# Patient Record
Sex: FEMALE
[De-identification: names, ages, dates, MRNs, and addresses within clinical notes are randomized per-mention and may not be internally consistent; named-entity substitution may affect disease eponyms.]

---

## 2022-12-08 ENCOUNTER — P2P PATIENT RECORD (OUTPATIENT)
Age: 72
End: 2022-12-08

## 2022-12-29 ENCOUNTER — LAB OUTSIDE AN ENCOUNTER (OUTPATIENT)
Dept: URBAN - METROPOLITAN AREA CLINIC 84 | Facility: CLINIC | Age: 72
End: 2022-12-29

## 2022-12-29 ENCOUNTER — OFFICE VISIT (OUTPATIENT)
Dept: URBAN - METROPOLITAN AREA CLINIC 84 | Facility: CLINIC | Age: 72
End: 2022-12-29
Payer: MEDICARE

## 2022-12-29 VITALS
TEMPERATURE: 97.6 F | SYSTOLIC BLOOD PRESSURE: 148 MMHG | HEART RATE: 84 BPM | BODY MASS INDEX: 30.02 KG/M2 | WEIGHT: 186.8 LBS | HEIGHT: 66 IN | DIASTOLIC BLOOD PRESSURE: 83 MMHG

## 2022-12-29 DIAGNOSIS — Z86.010 PERSONAL HISTORY OF COLONIC POLYPS: ICD-10-CM

## 2022-12-29 DIAGNOSIS — R09.89 GLOBUS SENSATION: ICD-10-CM

## 2022-12-29 DIAGNOSIS — R14.2 BURPING: ICD-10-CM

## 2022-12-29 DIAGNOSIS — R13.10 ESOPHAGEAL DYSPHAGIA: ICD-10-CM

## 2022-12-29 PROCEDURE — 99204 OFFICE O/P NEW MOD 45 MIN: CPT | Performed by: INTERNAL MEDICINE

## 2022-12-29 RX ORDER — SUCRALFATE 1 G/1
TAKE 1 TABLET BY MOUTH 4 TIMES DAILY TABLET ORAL
Qty: 360 EACH | Refills: 0 | Status: ACTIVE | COMMUNITY

## 2022-12-29 RX ORDER — CEFADROXIL 500 MG/1
TAKE 1 CAPSULE BY MOUTH TWICE DAILY FOR 10 DAYS CAPSULE ORAL
Qty: 20 EACH | Refills: 0 | Status: ON HOLD | COMMUNITY

## 2022-12-29 RX ORDER — PANTOPRAZOLE 40 MG/1
TABLET, DELAYED RELEASE ORAL
Qty: 30 TABLET | Status: ACTIVE | COMMUNITY

## 2022-12-29 RX ORDER — EPINEPHRINE 0.3 MG/.3ML
AS DIRECTED INJECTION INTRAMUSCULAR; SUBCUTANEOUS
Qty: 2 EACH | Refills: 0 | Status: ON HOLD | COMMUNITY

## 2022-12-29 RX ORDER — ESCITALOPRAM 5 MG/1
TABLET, FILM COATED ORAL
Qty: 30 TABLET | Status: ON HOLD | COMMUNITY

## 2022-12-29 RX ORDER — DOXYCYCLINE HYCLATE 100 MG/1
TAKE 1 TABLET BY MOUTH TWICE DAILY FOR 7 DAYS TABLET, FILM COATED ORAL
Qty: 14 EACH | Refills: 0 | Status: ON HOLD | COMMUNITY

## 2022-12-29 RX ORDER — AMLODIPINE BESYLATE 5 MG/1
TABLET ORAL
Qty: 90 TABLET | Status: ACTIVE | COMMUNITY

## 2022-12-29 RX ORDER — LOSARTAN POTASSIUM AND HYDROCHLOROTHIAZIDE 100; 25 MG/1; MG/1
TABLET, FILM COATED ORAL
Qty: 90 TABLET | Status: ACTIVE | COMMUNITY

## 2022-12-29 RX ORDER — IPRATROPIUM BROMIDE 21 UG/1
USE 2 APPLICATIONS IN EACH NOSTRIL FOR RUNNY NOSE NASALLY 4 TIMES A DAY AS NEEDED FOR 30 DAYS SPRAY, METERED NASAL
Qty: 30 MILLILITER | Refills: 0 | Status: ON HOLD | COMMUNITY

## 2022-12-29 RX ORDER — EZETIMIBE 10 MG/1
TABLET ORAL
Qty: 90 TABLET | Status: ACTIVE | COMMUNITY

## 2022-12-29 RX ORDER — RABEPRAZOLE SODIUM 20 MG/1
TAKE 1 TABLET BY MOUTH ONCE DAILY TABLET, DELAYED RELEASE ORAL
Qty: 30 EACH | Refills: 1 | Status: ACTIVE | COMMUNITY

## 2022-12-29 RX ORDER — POLYETHYLENE GLYCOL 3350, SODIUM SULFATE ANHYDROUS, SODIUM BICARBONATE, SODIUM CHLORIDE, POTASSIUM CHLORIDE 236; 22.74; 6.74; 5.86; 2.97 G/4L; G/4L; G/4L; G/4L; G/4L
AS DIRECTED POWDER, FOR SOLUTION ORAL ONCE
Qty: 1 | Refills: 0 | OUTPATIENT
Start: 2022-12-29 | End: 2022-12-30

## 2022-12-29 RX ORDER — MONTELUKAST SODIUM 10 MG/1
TABLET, FILM COATED ORAL
Qty: 30 TABLET | Status: ON HOLD | COMMUNITY

## 2022-12-29 NOTE — HPI-TODAY'S VISIT:
December 2022 visit: Patient was seen in December 22 for acid reflux and chest discomfort - was evaluate at [facility] ER.  Chest x-ray was unrevealing.  Labs revealed normal LFTs, hemoglobin 12.  egd / colonoscopy in july 2018 done at [facility]. benign polyps were removed. possible gastritis , may be antibiotics prescribed.  has trouble burping. no dysphagia. no heartburn. also feels globus sensation. was prescribed protonix but she isnt taking. using carafate 3 times per day. using rabeprazole instead as prescribed by pcp.

## 2023-01-06 PROBLEM — 40890009 ESOPHAGEAL DYSPHAGIA: Status: ACTIVE | Noted: 2022-12-29

## 2023-01-12 ENCOUNTER — OFFICE VISIT (OUTPATIENT)
Dept: URBAN - METROPOLITAN AREA SURGERY CENTER 20 | Facility: SURGERY CENTER | Age: 73
End: 2023-01-12

## 2023-01-12 ENCOUNTER — CLAIMS CREATED FROM THE CLAIM WINDOW (OUTPATIENT)
Dept: URBAN - METROPOLITAN AREA CLINIC 4 | Facility: CLINIC | Age: 73
End: 2023-01-12
Payer: MEDICARE

## 2023-01-12 ENCOUNTER — CLAIMS CREATED FROM THE CLAIM WINDOW (OUTPATIENT)
Dept: URBAN - METROPOLITAN AREA SURGERY CENTER 20 | Facility: SURGERY CENTER | Age: 73
End: 2023-01-12
Payer: MEDICARE

## 2023-01-12 DIAGNOSIS — K29.60 ADENOPAPILLOMATOSIS GASTRICA: ICD-10-CM

## 2023-01-12 DIAGNOSIS — K31.7 BENIGN GASTRIC POLYP: ICD-10-CM

## 2023-01-12 DIAGNOSIS — K31.A0 GASTRIC INTESTINAL METAPLASIA, UNSPECIFIED: ICD-10-CM

## 2023-01-12 DIAGNOSIS — K31.89 OTHER DISEASES OF STOMACH AND DUODENUM: ICD-10-CM

## 2023-01-12 DIAGNOSIS — D13.1 BENIGN NEOPLASM OF STOMACH: ICD-10-CM

## 2023-01-12 PROCEDURE — 43239 EGD BIOPSY SINGLE/MULTIPLE: CPT | Performed by: INTERNAL MEDICINE

## 2023-01-12 PROCEDURE — 43251 EGD REMOVE LESION SNARE: CPT | Performed by: INTERNAL MEDICINE

## 2023-01-12 PROCEDURE — G8907 PT DOC NO EVENTS ON DISCHARG: HCPCS | Performed by: INTERNAL MEDICINE

## 2023-01-12 PROCEDURE — 88341 IMHCHEM/IMCYTCHM EA ADD ANTB: CPT | Performed by: PATHOLOGY

## 2023-01-12 PROCEDURE — 88305 TISSUE EXAM BY PATHOLOGIST: CPT | Performed by: PATHOLOGY

## 2023-01-12 PROCEDURE — 88342 IMHCHEM/IMCYTCHM 1ST ANTB: CPT | Performed by: PATHOLOGY

## 2023-01-12 RX ORDER — SUCRALFATE 1 G/1
TAKE 1 TABLET BY MOUTH 4 TIMES DAILY TABLET ORAL
Qty: 360 EACH | Refills: 0 | Status: ACTIVE | COMMUNITY

## 2023-01-12 RX ORDER — MONTELUKAST SODIUM 10 MG/1
TABLET, FILM COATED ORAL
Qty: 30 TABLET | Status: ON HOLD | COMMUNITY

## 2023-01-12 RX ORDER — EZETIMIBE 10 MG/1
TABLET ORAL
Qty: 90 TABLET | Status: ACTIVE | COMMUNITY

## 2023-01-12 RX ORDER — AMLODIPINE BESYLATE 5 MG/1
TABLET ORAL
Qty: 90 TABLET | Status: ACTIVE | COMMUNITY

## 2023-01-12 RX ORDER — DOXYCYCLINE HYCLATE 100 MG/1
TAKE 1 TABLET BY MOUTH TWICE DAILY FOR 7 DAYS TABLET, FILM COATED ORAL
Qty: 14 EACH | Refills: 0 | Status: ON HOLD | COMMUNITY

## 2023-01-12 RX ORDER — CEFADROXIL 500 MG/1
TAKE 1 CAPSULE BY MOUTH TWICE DAILY FOR 10 DAYS CAPSULE ORAL
Qty: 20 EACH | Refills: 0 | Status: ON HOLD | COMMUNITY

## 2023-01-12 RX ORDER — IPRATROPIUM BROMIDE 21 UG/1
USE 2 APPLICATIONS IN EACH NOSTRIL FOR RUNNY NOSE NASALLY 4 TIMES A DAY AS NEEDED FOR 30 DAYS SPRAY, METERED NASAL
Qty: 30 MILLILITER | Refills: 0 | Status: ON HOLD | COMMUNITY

## 2023-01-12 RX ORDER — EPINEPHRINE 0.3 MG/.3ML
AS DIRECTED INJECTION INTRAMUSCULAR; SUBCUTANEOUS
Qty: 2 EACH | Refills: 0 | Status: ON HOLD | COMMUNITY

## 2023-01-12 RX ORDER — ESCITALOPRAM 5 MG/1
TABLET, FILM COATED ORAL
Qty: 30 TABLET | Status: ON HOLD | COMMUNITY

## 2023-01-12 RX ORDER — PANTOPRAZOLE 40 MG/1
TABLET, DELAYED RELEASE ORAL
Qty: 30 TABLET | Status: ACTIVE | COMMUNITY

## 2023-01-12 RX ORDER — LOSARTAN POTASSIUM AND HYDROCHLOROTHIAZIDE 100; 25 MG/1; MG/1
TABLET, FILM COATED ORAL
Qty: 90 TABLET | Status: ACTIVE | COMMUNITY

## 2023-01-12 RX ORDER — RABEPRAZOLE SODIUM 20 MG/1
TAKE 1 TABLET BY MOUTH ONCE DAILY TABLET, DELAYED RELEASE ORAL
Qty: 30 EACH | Refills: 1 | Status: ACTIVE | COMMUNITY

## 2023-01-17 PROBLEM — 428283002 HISTORY OF POLYP OF COLON (SITUATION): Status: ACTIVE | Noted: 2022-12-29

## 2023-01-23 ENCOUNTER — TELEPHONE ENCOUNTER (OUTPATIENT)
Dept: URBAN - METROPOLITAN AREA CLINIC 84 | Facility: CLINIC | Age: 73
End: 2023-01-23

## 2023-01-25 ENCOUNTER — TELEPHONE ENCOUNTER (OUTPATIENT)
Dept: URBAN - METROPOLITAN AREA CLINIC 84 | Facility: CLINIC | Age: 73
End: 2023-01-25

## 2023-01-30 ENCOUNTER — TELEPHONE ENCOUNTER (OUTPATIENT)
Dept: URBAN - METROPOLITAN AREA CLINIC 84 | Facility: CLINIC | Age: 73
End: 2023-01-30

## 2023-02-03 ENCOUNTER — TELEPHONE ENCOUNTER (OUTPATIENT)
Dept: URBAN - METROPOLITAN AREA CLINIC 25 | Facility: CLINIC | Age: 73
End: 2023-02-03

## 2023-02-23 ENCOUNTER — WEB ENCOUNTER (OUTPATIENT)
Dept: URBAN - METROPOLITAN AREA SURGERY CENTER 20 | Facility: SURGERY CENTER | Age: 73
End: 2023-02-23

## 2023-02-23 ENCOUNTER — CLAIMS CREATED FROM THE CLAIM WINDOW (OUTPATIENT)
Dept: URBAN - METROPOLITAN AREA CLINIC 4 | Facility: CLINIC | Age: 73
End: 2023-02-23
Payer: MEDICARE

## 2023-02-23 ENCOUNTER — OFFICE VISIT (OUTPATIENT)
Dept: URBAN - METROPOLITAN AREA SURGERY CENTER 20 | Facility: SURGERY CENTER | Age: 73
End: 2023-02-23
Payer: MEDICARE

## 2023-02-23 DIAGNOSIS — D12.2 ADENOMA OF ASCENDING COLON: ICD-10-CM

## 2023-02-23 DIAGNOSIS — Z86.010 ADENOMAS PERSONAL HISTORY OF COLONIC POLYPS: ICD-10-CM

## 2023-02-23 DIAGNOSIS — D12.4 ADENOMA OF DESCENDING COLON: ICD-10-CM

## 2023-02-23 DIAGNOSIS — D12.3 BENIGN NEOPLASM OF TRANSVERSE COLON: ICD-10-CM

## 2023-02-23 DIAGNOSIS — D12.3 ADENOMA OF TRANSVERSE COLON: ICD-10-CM

## 2023-02-23 PROCEDURE — 88305 TISSUE EXAM BY PATHOLOGIST: CPT | Performed by: PATHOLOGY

## 2023-02-23 PROCEDURE — G8907 PT DOC NO EVENTS ON DISCHARG: HCPCS | Performed by: INTERNAL MEDICINE

## 2023-02-23 PROCEDURE — 45385 COLONOSCOPY W/LESION REMOVAL: CPT | Performed by: INTERNAL MEDICINE

## 2023-02-23 RX ORDER — DOXYCYCLINE HYCLATE 100 MG/1
TAKE 1 TABLET BY MOUTH TWICE DAILY FOR 7 DAYS TABLET, FILM COATED ORAL
Qty: 14 EACH | Refills: 0 | Status: ON HOLD | COMMUNITY

## 2023-02-23 RX ORDER — EPINEPHRINE 0.3 MG/.3ML
AS DIRECTED INJECTION INTRAMUSCULAR; SUBCUTANEOUS
Qty: 2 EACH | Refills: 0 | Status: ON HOLD | COMMUNITY

## 2023-02-23 RX ORDER — ESCITALOPRAM 5 MG/1
TABLET, FILM COATED ORAL
Qty: 30 TABLET | Status: ON HOLD | COMMUNITY

## 2023-02-23 RX ORDER — MONTELUKAST SODIUM 10 MG/1
TABLET, FILM COATED ORAL
Qty: 30 TABLET | Status: ON HOLD | COMMUNITY

## 2023-02-23 RX ORDER — EZETIMIBE 10 MG/1
TABLET ORAL
Qty: 90 TABLET | Status: ACTIVE | COMMUNITY

## 2023-02-23 RX ORDER — PANTOPRAZOLE 40 MG/1
TABLET, DELAYED RELEASE ORAL
Qty: 30 TABLET | Status: ACTIVE | COMMUNITY

## 2023-02-23 RX ORDER — CEFADROXIL 500 MG/1
TAKE 1 CAPSULE BY MOUTH TWICE DAILY FOR 10 DAYS CAPSULE ORAL
Qty: 20 EACH | Refills: 0 | Status: ON HOLD | COMMUNITY

## 2023-02-23 RX ORDER — IPRATROPIUM BROMIDE 21 UG/1
USE 2 APPLICATIONS IN EACH NOSTRIL FOR RUNNY NOSE NASALLY 4 TIMES A DAY AS NEEDED FOR 30 DAYS SPRAY, METERED NASAL
Qty: 30 MILLILITER | Refills: 0 | Status: ON HOLD | COMMUNITY

## 2023-02-23 RX ORDER — AMLODIPINE BESYLATE 5 MG/1
TABLET ORAL
Qty: 90 TABLET | Status: ACTIVE | COMMUNITY

## 2023-02-23 RX ORDER — RABEPRAZOLE SODIUM 20 MG/1
TAKE 1 TABLET BY MOUTH ONCE DAILY TABLET, DELAYED RELEASE ORAL
Qty: 30 EACH | Refills: 1 | Status: ACTIVE | COMMUNITY

## 2023-02-23 RX ORDER — SUCRALFATE 1 G/1
TAKE 1 TABLET BY MOUTH 4 TIMES DAILY TABLET ORAL
Qty: 360 EACH | Refills: 0 | Status: ACTIVE | COMMUNITY

## 2023-02-23 RX ORDER — LOSARTAN POTASSIUM AND HYDROCHLOROTHIAZIDE 100; 25 MG/1; MG/1
TABLET, FILM COATED ORAL
Qty: 90 TABLET | Status: ACTIVE | COMMUNITY

## 2023-03-06 LAB
H PYLORI BREATH TEST: DETECTED
H. PYLORI BREATH TEST: DETECTED
INTERPRETATION: DETECTED

## 2023-03-07 ENCOUNTER — TELEPHONE ENCOUNTER (OUTPATIENT)
Dept: URBAN - METROPOLITAN AREA CLINIC 25 | Facility: CLINIC | Age: 73
End: 2023-03-07

## 2023-03-07 RX ORDER — SUCRALFATE 1 G/1
TAKE 1 TABLET BY MOUTH 4 TIMES DAILY TABLET ORAL
Qty: 360 EACH | Refills: 0 | Status: ACTIVE | COMMUNITY

## 2023-03-07 RX ORDER — CEFADROXIL 500 MG/1
TAKE 1 CAPSULE BY MOUTH TWICE DAILY FOR 10 DAYS CAPSULE ORAL
Qty: 20 EACH | Refills: 0 | Status: ON HOLD | COMMUNITY

## 2023-03-07 RX ORDER — ESCITALOPRAM 5 MG/1
TABLET, FILM COATED ORAL
Qty: 30 TABLET | Status: ON HOLD | COMMUNITY

## 2023-03-07 RX ORDER — RABEPRAZOLE SODIUM 20 MG/1
TAKE 1 TABLET BY MOUTH ONCE DAILY TABLET, DELAYED RELEASE ORAL
Qty: 30 EACH | Refills: 1 | Status: ACTIVE | COMMUNITY

## 2023-03-07 RX ORDER — DOXYCYCLINE HYCLATE 100 MG/1
TAKE 1 TABLET BY MOUTH TWICE DAILY FOR 7 DAYS TABLET, FILM COATED ORAL
Qty: 14 EACH | Refills: 0 | Status: ON HOLD | COMMUNITY

## 2023-03-07 RX ORDER — EZETIMIBE 10 MG/1
TABLET ORAL
Qty: 90 TABLET | Status: ACTIVE | COMMUNITY

## 2023-03-07 RX ORDER — DOXYCYCLINE 100 MG/1
1 CAPSULE CAPSULE ORAL
Qty: 28 CAPSULE | Refills: 0 | OUTPATIENT
Start: 2023-03-07 | End: 2023-03-21

## 2023-03-07 RX ORDER — EPINEPHRINE 0.3 MG/.3ML
AS DIRECTED INJECTION INTRAMUSCULAR; SUBCUTANEOUS
Qty: 2 EACH | Refills: 0 | Status: ON HOLD | COMMUNITY

## 2023-03-07 RX ORDER — LOSARTAN POTASSIUM AND HYDROCHLOROTHIAZIDE 100; 25 MG/1; MG/1
TABLET, FILM COATED ORAL
Qty: 90 TABLET | Status: ACTIVE | COMMUNITY

## 2023-03-07 RX ORDER — PANTOPRAZOLE 40 MG/1
TABLET, DELAYED RELEASE ORAL
Qty: 30 TABLET | Status: ACTIVE | COMMUNITY

## 2023-03-07 RX ORDER — AMLODIPINE BESYLATE 5 MG/1
TABLET ORAL
Qty: 90 TABLET | Status: ACTIVE | COMMUNITY

## 2023-03-07 RX ORDER — OMEPRAZOLE 20 MG/1
1 CAPSULE 30 MINUTES BEFORE MORNING MEAL CAPSULE, DELAYED RELEASE ORAL TWICE A DAY
Qty: 28 CAPSULE | Refills: 0 | OUTPATIENT
Start: 2023-03-07

## 2023-03-07 RX ORDER — METRONIDAZOLE 500 MG/1
1 TABLET TABLET ORAL TWICE A DAY
Qty: 28 TABLET | Refills: 0 | OUTPATIENT
Start: 2023-03-07 | End: 2023-03-21

## 2023-03-07 RX ORDER — MONTELUKAST SODIUM 10 MG/1
TABLET, FILM COATED ORAL
Qty: 30 TABLET | Status: ON HOLD | COMMUNITY

## 2023-03-07 RX ORDER — BISMUTH SUBSALICYLATE 262 MG/1
2 TABLETS TABLET, CHEWABLE ORAL
Qty: 112 TABLET | Refills: 0 | OUTPATIENT
Start: 2023-03-08 | End: 2023-03-22

## 2023-03-07 RX ORDER — IPRATROPIUM BROMIDE 21 UG/1
USE 2 APPLICATIONS IN EACH NOSTRIL FOR RUNNY NOSE NASALLY 4 TIMES A DAY AS NEEDED FOR 30 DAYS SPRAY, METERED NASAL
Qty: 30 MILLILITER | Refills: 0 | Status: ON HOLD | COMMUNITY

## 2023-03-20 ENCOUNTER — TELEPHONE ENCOUNTER (OUTPATIENT)
Dept: URBAN - METROPOLITAN AREA CLINIC 84 | Facility: CLINIC | Age: 73
End: 2023-03-20

## 2023-04-10 ENCOUNTER — TELEPHONE ENCOUNTER (OUTPATIENT)
Dept: URBAN - METROPOLITAN AREA CLINIC 84 | Facility: CLINIC | Age: 73
End: 2023-04-10

## 2023-05-01 LAB
H PYLORI BREATH TEST: NOT DETECTED
H. PYLORI BREATH TEST: NOT DETECTED
INTERPRETATION: NOT DETECTED

## 2024-03-14 ENCOUNTER — OV EP (OUTPATIENT)
Dept: URBAN - METROPOLITAN AREA CLINIC 84 | Facility: CLINIC | Age: 74
End: 2024-03-14
Payer: MEDICARE

## 2024-03-14 ENCOUNTER — LAB (OUTPATIENT)
Dept: URBAN - METROPOLITAN AREA CLINIC 84 | Facility: CLINIC | Age: 74
End: 2024-03-14

## 2024-03-14 VITALS
DIASTOLIC BLOOD PRESSURE: 70 MMHG | HEIGHT: 66 IN | BODY MASS INDEX: 29.89 KG/M2 | HEART RATE: 92 BPM | WEIGHT: 186 LBS | TEMPERATURE: 97.2 F | SYSTOLIC BLOOD PRESSURE: 119 MMHG

## 2024-03-14 DIAGNOSIS — R09.89 GLOBUS SENSATION: ICD-10-CM

## 2024-03-14 DIAGNOSIS — A04.8 HELICOBACTER PYLORI (H. PYLORI) INFECTION: ICD-10-CM

## 2024-03-14 DIAGNOSIS — R07.89 ATYPICAL CHEST PAIN: ICD-10-CM

## 2024-03-14 DIAGNOSIS — Z86.010 PERSONAL HISTORY OF COLONIC POLYPS: ICD-10-CM

## 2024-03-14 PROCEDURE — 99214 OFFICE O/P EST MOD 30 MIN: CPT | Performed by: INTERNAL MEDICINE

## 2024-03-14 RX ORDER — LOSARTAN POTASSIUM AND HYDROCHLOROTHIAZIDE 100; 25 MG/1; MG/1
TABLET, FILM COATED ORAL
Qty: 90 TABLET | Status: ACTIVE | COMMUNITY

## 2024-03-14 RX ORDER — CEFADROXIL 500 MG/1
TAKE 1 CAPSULE BY MOUTH TWICE DAILY FOR 10 DAYS CAPSULE ORAL
Qty: 20 EACH | Refills: 0 | Status: ON HOLD | COMMUNITY

## 2024-03-14 RX ORDER — PANTOPRAZOLE 40 MG/1
TABLET, DELAYED RELEASE ORAL
Qty: 30 TABLET | Status: DISCONTINUED | COMMUNITY

## 2024-03-14 RX ORDER — AMLODIPINE BESYLATE 5 MG/1
TABLET ORAL
Qty: 90 TABLET | Status: ACTIVE | COMMUNITY

## 2024-03-14 RX ORDER — SUCRALFATE 1 G/1
TAKE 1 TABLET BY MOUTH 4 TIMES DAILY TABLET ORAL
Qty: 360 EACH | Refills: 0 | Status: ON HOLD | COMMUNITY

## 2024-03-14 RX ORDER — EZETIMIBE 10 MG/1
TABLET ORAL
Qty: 90 TABLET | Status: ACTIVE | COMMUNITY

## 2024-03-14 RX ORDER — OMEPRAZOLE 20 MG/1
1 CAPSULE 30 MINUTES BEFORE MORNING MEAL CAPSULE, DELAYED RELEASE ORAL TWICE A DAY
Qty: 28 CAPSULE | Refills: 0 | Status: ON HOLD | COMMUNITY
Start: 2023-03-07

## 2024-03-14 RX ORDER — RABEPRAZOLE SODIUM 20 MG/1
TAKE 1 TABLET BY MOUTH ONCE DAILY TABLET, DELAYED RELEASE ORAL
Qty: 30 EACH | Refills: 1 | Status: ON HOLD | COMMUNITY

## 2024-03-14 RX ORDER — DOXYCYCLINE HYCLATE 100 MG/1
TAKE 1 TABLET BY MOUTH TWICE DAILY FOR 7 DAYS TABLET, FILM COATED ORAL
Qty: 14 EACH | Refills: 0 | Status: ON HOLD | COMMUNITY

## 2024-03-14 RX ORDER — MIRABEGRON 50 MG/1
1 TABLET TABLET, FILM COATED, EXTENDED RELEASE ORAL ONCE A DAY
Status: ACTIVE | COMMUNITY

## 2024-03-14 RX ORDER — ESCITALOPRAM 5 MG/1
TABLET, FILM COATED ORAL
Qty: 30 TABLET | Status: ON HOLD | COMMUNITY

## 2024-03-14 RX ORDER — IPRATROPIUM BROMIDE 21 UG/1
USE 2 APPLICATIONS IN EACH NOSTRIL FOR RUNNY NOSE NASALLY 4 TIMES A DAY AS NEEDED FOR 30 DAYS SPRAY, METERED NASAL
Qty: 30 MILLILITER | Refills: 0 | Status: ON HOLD | COMMUNITY

## 2024-03-14 RX ORDER — EPINEPHRINE 0.3 MG/.3ML
AS DIRECTED INJECTION INTRAMUSCULAR; SUBCUTANEOUS
Qty: 2 EACH | Refills: 0 | Status: ON HOLD | COMMUNITY

## 2024-03-14 RX ORDER — MONTELUKAST SODIUM 10 MG/1
TABLET, FILM COATED ORAL
Qty: 30 TABLET | Status: ON HOLD | COMMUNITY

## 2024-03-14 NOTE — HPI-OTHER HISTORIES
December 2022 visit: Patient was seen in December 22 for acid reflux and chest discomfort [facility] Chest x-ray was unrevealing. Labs revealed normal LFTs, hemoglobin 12.  egd / colonoscopy in july 2018 done at [facility]. benign polyps were removed. possible gastritis , may be antibiotics prescribed.  has trouble burping. no dysphagia. no heartburn. also feels globus sensation. was prescribed protonix but she isnt taking. using carafate 3 times per day. using rabeprazole instead as prescribed by pcp.

## 2024-03-14 NOTE — HPI-TODAY'S VISIT:
March 24 visit:Patient underwent a EGD and a colonoscopy in February 2023.  EGD revealed a 6 mm cardia hyperplastic polyp and additional small gastric antral polyps. chronic inactive gastritis, suggestive of treated H pylori gastritis. 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 pylori breath test positive in March 2023 status posttreatment with eradication confirmed on repeat H. pylori breath test in May 2023.  no heartburn. she cant explain her symptoms too well. feels pain in her midchest but seems to come up from her epigastric area. sometimes worse with hunger. this hasnt happened in recent past. using pepcid daily. not on PPI. mild nausea.

## 2024-05-20 ENCOUNTER — TELEPHONE ENCOUNTER (OUTPATIENT)
Dept: URBAN - METROPOLITAN AREA CLINIC 84 | Facility: CLINIC | Age: 74
End: 2024-05-20

## 2024-05-23 ENCOUNTER — CLAIMS CREATED FROM THE CLAIM WINDOW (OUTPATIENT)
Dept: URBAN - METROPOLITAN AREA SURGERY CENTER 20 | Facility: SURGERY CENTER | Age: 74
End: 2024-05-23

## 2024-05-23 ENCOUNTER — OUT OF OFFICE VISIT (OUTPATIENT)
Dept: URBAN - METROPOLITAN AREA SURGERY CENTER 20 | Facility: SURGERY CENTER | Age: 74
End: 2024-05-23
Payer: MEDICARE

## 2024-05-23 DIAGNOSIS — K31.89 ACHYLIA: ICD-10-CM

## 2024-05-23 DIAGNOSIS — R12 BURNING REFLUX: ICD-10-CM

## 2024-05-23 DIAGNOSIS — K31.7 BENIGN GASTRIC POLYP: ICD-10-CM

## 2024-05-23 DIAGNOSIS — R10.13 ABDOMINAL DISCOMFORT, EPIGASTRIC: ICD-10-CM

## 2024-05-23 DIAGNOSIS — I10 ACCELERATED ESSENTIAL HYPERTENSION: ICD-10-CM

## 2024-05-23 DIAGNOSIS — K22.89 DILATATION OF ESOPHAGUS: ICD-10-CM

## 2024-05-23 PROCEDURE — 00731 ANES UPR GI NDSC PX NOS: CPT | Performed by: NURSE ANESTHETIST, CERTIFIED REGISTERED

## 2024-05-23 RX ORDER — ESCITALOPRAM 5 MG/1
TABLET, FILM COATED ORAL
Qty: 30 TABLET | Status: ON HOLD | COMMUNITY

## 2024-05-23 RX ORDER — EZETIMIBE 10 MG/1
TABLET ORAL
Qty: 90 TABLET | Status: ACTIVE | COMMUNITY

## 2024-05-23 RX ORDER — DOXYCYCLINE HYCLATE 100 MG/1
TAKE 1 TABLET BY MOUTH TWICE DAILY FOR 7 DAYS TABLET, FILM COATED ORAL
Qty: 14 EACH | Refills: 0 | Status: ON HOLD | COMMUNITY

## 2024-05-23 RX ORDER — MONTELUKAST SODIUM 10 MG/1
TABLET, FILM COATED ORAL
Qty: 30 TABLET | Status: ON HOLD | COMMUNITY

## 2024-05-23 RX ORDER — RABEPRAZOLE SODIUM 20 MG/1
TAKE 1 TABLET BY MOUTH ONCE DAILY TABLET, DELAYED RELEASE ORAL
Qty: 30 EACH | Refills: 1 | Status: ON HOLD | COMMUNITY

## 2024-05-23 RX ORDER — LOSARTAN POTASSIUM AND HYDROCHLOROTHIAZIDE 100; 25 MG/1; MG/1
TABLET, FILM COATED ORAL
Qty: 90 TABLET | Status: ACTIVE | COMMUNITY

## 2024-05-23 RX ORDER — IPRATROPIUM BROMIDE 21 UG/1
USE 2 APPLICATIONS IN EACH NOSTRIL FOR RUNNY NOSE NASALLY 4 TIMES A DAY AS NEEDED FOR 30 DAYS SPRAY, METERED NASAL
Qty: 30 MILLILITER | Refills: 0 | Status: ON HOLD | COMMUNITY

## 2024-05-23 RX ORDER — MIRABEGRON 50 MG/1
1 TABLET TABLET, FILM COATED, EXTENDED RELEASE ORAL ONCE A DAY
Status: ACTIVE | COMMUNITY

## 2024-05-23 RX ORDER — SUCRALFATE 1 G/1
TAKE 1 TABLET BY MOUTH 4 TIMES DAILY TABLET ORAL
Qty: 360 EACH | Refills: 0 | Status: ON HOLD | COMMUNITY

## 2024-05-23 RX ORDER — AMLODIPINE BESYLATE 5 MG/1
TABLET ORAL
Qty: 90 TABLET | Status: ACTIVE | COMMUNITY

## 2024-05-23 RX ORDER — OMEPRAZOLE 20 MG/1
1 CAPSULE 30 MINUTES BEFORE MORNING MEAL CAPSULE, DELAYED RELEASE ORAL TWICE A DAY
Qty: 28 CAPSULE | Refills: 0 | Status: ON HOLD | COMMUNITY
Start: 2023-03-07

## 2024-05-23 RX ORDER — CEFADROXIL 500 MG/1
TAKE 1 CAPSULE BY MOUTH TWICE DAILY FOR 10 DAYS CAPSULE ORAL
Qty: 20 EACH | Refills: 0 | Status: ON HOLD | COMMUNITY

## 2024-05-23 RX ORDER — EPINEPHRINE 0.3 MG/.3ML
AS DIRECTED INJECTION INTRAMUSCULAR; SUBCUTANEOUS
Qty: 2 EACH | Refills: 0 | Status: ON HOLD | COMMUNITY

## 2024-06-04 ENCOUNTER — TELEPHONE ENCOUNTER (OUTPATIENT)
Dept: URBAN - METROPOLITAN AREA CLINIC 25 | Facility: CLINIC | Age: 74
End: 2024-06-04

## 2024-07-16 ENCOUNTER — DASHBOARD ENCOUNTERS (OUTPATIENT)
Age: 74
End: 2024-07-16

## 2024-07-16 ENCOUNTER — OFFICE VISIT (OUTPATIENT)
Dept: URBAN - METROPOLITAN AREA CLINIC 84 | Facility: CLINIC | Age: 74
End: 2024-07-16
Payer: MEDICARE

## 2024-07-16 VITALS
TEMPERATURE: 97.4 F | HEIGHT: 66 IN | WEIGHT: 187.8 LBS | BODY MASS INDEX: 30.18 KG/M2 | DIASTOLIC BLOOD PRESSURE: 66 MMHG | SYSTOLIC BLOOD PRESSURE: 112 MMHG | HEART RATE: 78 BPM

## 2024-07-16 DIAGNOSIS — A04.8 HELICOBACTER PYLORI (H. PYLORI) INFECTION: ICD-10-CM

## 2024-07-16 DIAGNOSIS — R07.89 ATYPICAL CHEST PAIN: ICD-10-CM

## 2024-07-16 DIAGNOSIS — R09.89 GLOBUS SENSATION: ICD-10-CM

## 2024-07-16 DIAGNOSIS — K31.7 HYPERPLASTIC GASTRIC POLYPS: ICD-10-CM

## 2024-07-16 PROCEDURE — 99214 OFFICE O/P EST MOD 30 MIN: CPT

## 2024-07-16 RX ORDER — EPINEPHRINE 0.3 MG/.3ML
AS DIRECTED INJECTION INTRAMUSCULAR; SUBCUTANEOUS
Qty: 2 EACH | Refills: 0 | Status: ON HOLD | COMMUNITY

## 2024-07-16 RX ORDER — SUCRALFATE 1 G/1
TAKE 1 TABLET BY MOUTH 4 TIMES DAILY TABLET ORAL
Qty: 360 EACH | Refills: 0 | Status: ON HOLD | COMMUNITY

## 2024-07-16 RX ORDER — AMLODIPINE BESYLATE 5 MG/1
TABLET ORAL
Qty: 90 TABLET | Status: ACTIVE | COMMUNITY

## 2024-07-16 RX ORDER — IPRATROPIUM BROMIDE 21 UG/1
USE 2 APPLICATIONS IN EACH NOSTRIL FOR RUNNY NOSE NASALLY 4 TIMES A DAY AS NEEDED FOR 30 DAYS SPRAY, METERED NASAL
Qty: 30 MILLILITER | Refills: 0 | Status: ON HOLD | COMMUNITY

## 2024-07-16 RX ORDER — LOSARTAN POTASSIUM AND HYDROCHLOROTHIAZIDE 100; 25 MG/1; MG/1
TABLET, FILM COATED ORAL
Qty: 90 TABLET | Status: ACTIVE | COMMUNITY

## 2024-07-16 RX ORDER — RABEPRAZOLE SODIUM 20 MG/1
TAKE 1 TABLET BY MOUTH ONCE DAILY TABLET, DELAYED RELEASE ORAL
Qty: 30 EACH | Refills: 1 | Status: ON HOLD | COMMUNITY

## 2024-07-16 RX ORDER — CEFADROXIL 500 MG/1
TAKE 1 CAPSULE BY MOUTH TWICE DAILY FOR 10 DAYS CAPSULE ORAL
Qty: 20 EACH | Refills: 0 | Status: ON HOLD | COMMUNITY

## 2024-07-16 RX ORDER — ESCITALOPRAM 5 MG/1
TABLET, FILM COATED ORAL
Qty: 30 TABLET | Status: ON HOLD | COMMUNITY

## 2024-07-16 RX ORDER — MIRABEGRON 50 MG/1
1 TABLET TABLET, FILM COATED, EXTENDED RELEASE ORAL ONCE A DAY
Status: ACTIVE | COMMUNITY

## 2024-07-16 RX ORDER — EZETIMIBE 10 MG/1
TABLET ORAL
Qty: 90 TABLET | Status: ACTIVE | COMMUNITY

## 2024-07-16 RX ORDER — OMEPRAZOLE 20 MG/1
1 CAPSULE 30 MINUTES BEFORE MORNING MEAL CAPSULE, DELAYED RELEASE ORAL TWICE A DAY
Qty: 28 CAPSULE | Refills: 0 | Status: ON HOLD | COMMUNITY
Start: 2023-03-07

## 2024-07-16 RX ORDER — DOXYCYCLINE HYCLATE 100 MG/1
TAKE 1 TABLET BY MOUTH TWICE DAILY FOR 7 DAYS TABLET, FILM COATED ORAL
Qty: 14 EACH | Refills: 0 | Status: ON HOLD | COMMUNITY

## 2024-07-16 RX ORDER — MONTELUKAST SODIUM 10 MG/1
TABLET, FILM COATED ORAL
Qty: 30 TABLET | Status: ON HOLD | COMMUNITY

## 2024-07-16 NOTE — HPI-TODAY'S VISIT:
07/24 OV  Midchest discomfort has resolved, patient reports pepcid use QAM usually on an empty stomach, cardiac w/u wnl. No heartbunr as of late, notes dyspeptic symptoms usually postprandial, last episode 3 weeks ago, usually goes away on its on and lasts for less than a minute. Gastric polyps benign,   EGD 2024  Z-line regular, 35 cm from the incisors. - No gross lesions in the entire esophagus. - Two gastric polyps.  Resected and retrieved. - Granular, nodular and texture changed mucosa in the gastric fundus and gastric body. - Gastroesophageal flap valve classified as Hill Grade II (fold present, opens with respiration). - Multiple biopsies were obtained in the lower third of the esophagus. - Normal esophagus  - Several biopsies were obtained in the gastric fundus, in the gastric body and in the gastric antrum - Chronic gastritis/reactive gastropathy  CHRONIC INACTIVE GASTRITIS WITH HISTOLOGICAL CHANGES SUGGESTIVE OF            TREATED H. PYLORI GASTRITIS

## 2024-07-16 NOTE — HPI-OTHER HISTORIES
March 24 visit:Patient underwent a EGD and a colonoscopy in February 2023. EGD revealed a 6 mm cardia hyperplastic polyp and additional small gastric antral polyps. chronic inactive gastritis, suggestive of treated H pylori gastritis. 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 pylori breath test positive in March 2023 status posttreatment with eradication confirmed on repeat H. pylori breath test in May 2023.  no heartburn. she cant explain her symptoms too well. feels pain in her midchest but seems to come up from her epigastric area. sometimes worse with hunger. this hasnt happened in recent past. using pepcid daily. not on PPI. mild nausea.  December 2022 visit: Patient was seen in December 22 for acid reflux and chest discomfort [facility] Chest x-ray was unrevealing. Labs revealed normal LFTs, hemoglobin 12.  egd / colonoscopy in july 2018 done at [facility]. benign polyps were removed. possible gastritis , may be antibiotics prescribed.  has trouble burping. no dysphagia. no heartburn. also feels globus sensation. was prescribed protonix but she isnt taking. using carafate 3 times per day. using rabeprazole instead as prescribed by pcp.

## 2024-07-18 ENCOUNTER — TELEPHONE ENCOUNTER (OUTPATIENT)
Dept: URBAN - METROPOLITAN AREA CLINIC 25 | Facility: CLINIC | Age: 74
End: 2024-07-18

## 2024-07-29 ENCOUNTER — TELEPHONE ENCOUNTER (OUTPATIENT)
Dept: URBAN - METROPOLITAN AREA CLINIC 84 | Facility: CLINIC | Age: 74
End: 2024-07-29

## 2024-11-12 ENCOUNTER — OFFICE VISIT (OUTPATIENT)
Dept: URBAN - METROPOLITAN AREA CLINIC 84 | Facility: CLINIC | Age: 74
End: 2024-11-12

## 2024-11-12 RX ORDER — LOSARTAN POTASSIUM AND HYDROCHLOROTHIAZIDE 100; 25 MG/1; MG/1
TABLET, FILM COATED ORAL
Qty: 90 TABLET | Status: ACTIVE | COMMUNITY

## 2024-11-12 RX ORDER — ESCITALOPRAM 5 MG/1
TABLET, FILM COATED ORAL
Qty: 30 TABLET | Status: ON HOLD | COMMUNITY

## 2024-11-12 RX ORDER — SUCRALFATE 1 G/1
TAKE 1 TABLET BY MOUTH 4 TIMES DAILY TABLET ORAL
Qty: 360 EACH | Refills: 0 | Status: ON HOLD | COMMUNITY

## 2024-11-12 RX ORDER — MONTELUKAST SODIUM 10 MG/1
TABLET, FILM COATED ORAL
Qty: 30 TABLET | Status: ON HOLD | COMMUNITY

## 2024-11-12 RX ORDER — MIRABEGRON 50 MG/1
1 TABLET TABLET, FILM COATED, EXTENDED RELEASE ORAL ONCE A DAY
Status: ACTIVE | COMMUNITY

## 2024-11-12 RX ORDER — DOXYCYCLINE HYCLATE 100 MG/1
TAKE 1 TABLET BY MOUTH TWICE DAILY FOR 7 DAYS TABLET, FILM COATED ORAL
Qty: 14 EACH | Refills: 0 | Status: ON HOLD | COMMUNITY

## 2024-11-12 RX ORDER — IPRATROPIUM BROMIDE 21 UG/1
USE 2 APPLICATIONS IN EACH NOSTRIL FOR RUNNY NOSE NASALLY 4 TIMES A DAY AS NEEDED FOR 30 DAYS SPRAY, METERED NASAL
Qty: 30 MILLILITER | Refills: 0 | Status: ON HOLD | COMMUNITY

## 2024-11-12 RX ORDER — RABEPRAZOLE SODIUM 20 MG/1
TAKE 1 TABLET BY MOUTH ONCE DAILY TABLET, DELAYED RELEASE ORAL
Qty: 30 EACH | Refills: 1 | Status: ON HOLD | COMMUNITY

## 2024-11-12 RX ORDER — AMLODIPINE BESYLATE 5 MG/1
TABLET ORAL
Qty: 90 TABLET | Status: ACTIVE | COMMUNITY

## 2024-11-12 RX ORDER — OMEPRAZOLE 20 MG/1
1 CAPSULE 30 MINUTES BEFORE MORNING MEAL CAPSULE, DELAYED RELEASE ORAL TWICE A DAY
Qty: 28 CAPSULE | Refills: 0 | Status: ON HOLD | COMMUNITY
Start: 2023-03-07

## 2024-11-12 RX ORDER — CEFADROXIL 500 MG/1
TAKE 1 CAPSULE BY MOUTH TWICE DAILY FOR 10 DAYS CAPSULE ORAL
Qty: 20 EACH | Refills: 0 | Status: ON HOLD | COMMUNITY

## 2024-11-12 RX ORDER — EZETIMIBE 10 MG/1
TABLET ORAL
Qty: 90 TABLET | Status: ACTIVE | COMMUNITY

## 2024-11-12 RX ORDER — EPINEPHRINE 0.3 MG/.3ML
AS DIRECTED INJECTION INTRAMUSCULAR; SUBCUTANEOUS
Qty: 2 EACH | Refills: 0 | Status: ON HOLD | COMMUNITY

## 2024-11-21 ENCOUNTER — OFFICE VISIT (OUTPATIENT)
Dept: URBAN - METROPOLITAN AREA CLINIC 84 | Facility: CLINIC | Age: 74
End: 2024-11-21
Payer: MEDICARE

## 2024-11-21 VITALS
HEART RATE: 86 BPM | SYSTOLIC BLOOD PRESSURE: 140 MMHG | BODY MASS INDEX: 30.5 KG/M2 | DIASTOLIC BLOOD PRESSURE: 81 MMHG | TEMPERATURE: 97.1 F | HEIGHT: 66 IN | WEIGHT: 189.8 LBS

## 2024-11-21 DIAGNOSIS — D36.9 TUBULOVILLOUS ADENOMA: ICD-10-CM

## 2024-11-21 DIAGNOSIS — R07.89 ATYPICAL CHEST PAIN: ICD-10-CM

## 2024-11-21 DIAGNOSIS — Z86.0100 HISTORY OF COLON POLYPS: ICD-10-CM

## 2024-11-21 DIAGNOSIS — K31.7 HYPERPLASTIC GASTRIC POLYPS: ICD-10-CM

## 2024-11-21 DIAGNOSIS — A04.8 HELICOBACTER PYLORI (H. PYLORI) INFECTION: ICD-10-CM

## 2024-11-21 PROCEDURE — 99213 OFFICE O/P EST LOW 20 MIN: CPT | Performed by: INTERNAL MEDICINE

## 2024-11-21 RX ORDER — MONTELUKAST SODIUM 10 MG/1
TABLET, FILM COATED ORAL
Qty: 30 TABLET | Status: ON HOLD | COMMUNITY

## 2024-11-21 RX ORDER — AMLODIPINE BESYLATE 5 MG/1
TABLET ORAL
Qty: 90 TABLET | Status: ACTIVE | COMMUNITY

## 2024-11-21 RX ORDER — OMEPRAZOLE 40 MG/1
1 CAPSULE 1/2 TO 1 HOUR BEFORE MORNING MEAL CAPSULE, DELAYED RELEASE ORAL ONCE A DAY
Qty: 30 | OUTPATIENT
Start: 2024-11-21

## 2024-11-21 RX ORDER — RABEPRAZOLE SODIUM 20 MG/1
TAKE 1 TABLET BY MOUTH ONCE DAILY TABLET, DELAYED RELEASE ORAL
Qty: 30 EACH | Refills: 1 | Status: ON HOLD | COMMUNITY

## 2024-11-21 RX ORDER — IPRATROPIUM BROMIDE 21 UG/1
USE 2 APPLICATIONS IN EACH NOSTRIL FOR RUNNY NOSE NASALLY 4 TIMES A DAY AS NEEDED FOR 30 DAYS SPRAY, METERED NASAL
Qty: 30 MILLILITER | Refills: 0 | Status: ON HOLD | COMMUNITY

## 2024-11-21 RX ORDER — MIRABEGRON 50 MG/1
1 TABLET TABLET, FILM COATED, EXTENDED RELEASE ORAL ONCE A DAY
Status: ACTIVE | COMMUNITY

## 2024-11-21 RX ORDER — LOSARTAN POTASSIUM AND HYDROCHLOROTHIAZIDE 100; 25 MG/1; MG/1
TABLET, FILM COATED ORAL
Qty: 90 TABLET | Status: ACTIVE | COMMUNITY

## 2024-11-21 RX ORDER — ESCITALOPRAM 5 MG/1
TABLET, FILM COATED ORAL
Qty: 30 TABLET | Status: ON HOLD | COMMUNITY

## 2024-11-21 RX ORDER — EZETIMIBE 10 MG/1
TABLET ORAL
Qty: 90 TABLET | Status: ACTIVE | COMMUNITY

## 2024-11-21 RX ORDER — OMEPRAZOLE 20 MG/1
1 CAPSULE 30 MINUTES BEFORE MORNING MEAL CAPSULE, DELAYED RELEASE ORAL TWICE A DAY
Qty: 28 CAPSULE | Refills: 0 | Status: ON HOLD | COMMUNITY
Start: 2023-03-07

## 2024-11-21 RX ORDER — DOXYCYCLINE HYCLATE 100 MG/1
TAKE 1 TABLET BY MOUTH TWICE DAILY FOR 7 DAYS TABLET, FILM COATED ORAL
Qty: 14 EACH | Refills: 0 | Status: ON HOLD | COMMUNITY

## 2024-11-21 RX ORDER — SUCRALFATE 1 G/1
TAKE 1 TABLET BY MOUTH 4 TIMES DAILY TABLET ORAL
Qty: 360 EACH | Refills: 0 | Status: ON HOLD | COMMUNITY

## 2024-11-21 RX ORDER — EPINEPHRINE 0.3 MG/.3ML
AS DIRECTED INJECTION INTRAMUSCULAR; SUBCUTANEOUS
Qty: 2 EACH | Refills: 0 | Status: ON HOLD | COMMUNITY

## 2024-11-21 RX ORDER — CEFADROXIL 500 MG/1
TAKE 1 CAPSULE BY MOUTH TWICE DAILY FOR 10 DAYS CAPSULE ORAL
Qty: 20 EACH | Refills: 0 | Status: ON HOLD | COMMUNITY

## 2024-11-21 NOTE — HPI-OTHER HISTORIES
07/24 OV  Midchest discomfort has resolved, patient reports pepcid use QAM usually on an empty stomach, cardiac w/u wnl. No heartbunr as of late, notes dyspeptic symptoms usually postprandial, last episode 3 weeks ago, usually goes away on its on and lasts for less than a minute. Gastric polyps benign,   EGD 2024  Z-line regular, 35 cm from the incisors. - No gross lesions in the entire esophagus. - Two gastric polyps. Resected and retrieved. - Granular, nodular and texture changed mucosa in the gastric fundus and gastric body. - Gastroesophageal flap valve classified as Hill Grade II (fold present, opens with respiration). - Multiple biopsies were obtained in the lower third of the esophagus. - Normal esophagus  - Several biopsies were obtained in the gastric fundus, in the gastric body and in the gastric antrum - Chronic gastritis/reactive gastropathy  CHRONIC INACTIVE GASTRITIS WITH HISTOLOGICAL CHANGES SUGGESTIVE OF TREATED H. PYLORI GASTRITIS  March 24 visit:Patient underwent a EGD and a colonoscopy in February 2023. EGD revealed a 6 mm cardia hyperplastic polyp and additional small gastric antral polyps. chronic inactive gastritis, suggestive of treated H pylori gastritis. 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 pylori breath test positive in March 2023 status posttreatment with eradication confirmed on repeat H. pylori breath test in May 2023.  no heartburn. she cant explain her symptoms too well. feels pain in her midchest but seems to come up from her epigastric area. sometimes worse with hunger. this hasnt happened in recent past. using pepcid daily. not on PPI. mild nausea.  December 2022 visit: Patient was seen in December 22 for acid reflux and chest discomfort - was evaluate at [facility] ER. Chest x-ray was unrevealing. Labs revealed normal LFTs, hemoglobin 12.  egd / colonoscopy in july 2018 done at [facility]. benign polyps were removed. possible gastritis , may be antibiotics prescribed.  has trouble burping. no dysphagia. no heartburn. also feels globus sensation. was prescribed protonix but she isnt taking. using carafate 3 times per day. using rabeprazole instead as prescribed by pcp.

## 2024-11-21 NOTE — HPI-TODAY'S VISIT:
Nov 2024: h pylori BT negative in aug 2024. vague mid chest discomfort X not always related to meals. started yesterday. also dyspeptic symptoms. takes miralax for constipation.

## 2025-03-28 ENCOUNTER — TELEPHONE ENCOUNTER (OUTPATIENT)
Dept: URBAN - METROPOLITAN AREA CLINIC 25 | Facility: CLINIC | Age: 75
End: 2025-03-28

## 2025-05-08 ENCOUNTER — OFFICE VISIT (OUTPATIENT)
Dept: URBAN - METROPOLITAN AREA CLINIC 84 | Facility: CLINIC | Age: 75
End: 2025-05-08
Payer: COMMERCIAL

## 2025-05-08 DIAGNOSIS — Z86.0101 H/O ADENOMATOUS POLYP OF COLON: ICD-10-CM

## 2025-05-08 DIAGNOSIS — A04.8 HELICOBACTER PYLORI (H. PYLORI) INFECTION: ICD-10-CM

## 2025-05-08 DIAGNOSIS — R07.89 ATYPICAL CHEST PAIN: ICD-10-CM

## 2025-05-08 DIAGNOSIS — K59.04 CHRONIC IDIOPATHIC CONSTIPATION: ICD-10-CM

## 2025-05-08 PROCEDURE — 99213 OFFICE O/P EST LOW 20 MIN: CPT | Performed by: INTERNAL MEDICINE

## 2025-05-08 RX ORDER — ESOMEPRAZOLE MAGNESIUM 20 MG/1
1 CAPSULE 1/2 TO 1 HOUR BEFORE MORNING MEAL CAPSULE, DELAYED RELEASE ORAL ONCE A DAY
Qty: 90 CAPSULE | Refills: 3 | OUTPATIENT
Start: 2025-05-08

## 2025-05-08 RX ORDER — OMEPRAZOLE 40 MG/1
1 CAPSULE 1/2 TO 1 HOUR BEFORE MORNING MEAL CAPSULE, DELAYED RELEASE ORAL ONCE A DAY
Qty: 30 | Status: ON HOLD | COMMUNITY
Start: 2024-11-21

## 2025-05-08 RX ORDER — MIRABEGRON 50 MG/1
1 TABLET TABLET, FILM COATED, EXTENDED RELEASE ORAL ONCE A DAY
Status: ON HOLD | COMMUNITY

## 2025-05-08 RX ORDER — AZELASTINE 1 MG/ML
USE 1 TO 2 TWICE DAILY FOR 30 DAYS AS NEEDED USE WITH FLUTICASONE FOR ADDED EFFECT SPRAY, METERED NASAL
Qty: 30 MILLILITER | Refills: 0 | Status: ACTIVE | COMMUNITY

## 2025-05-08 RX ORDER — MONTELUKAST SODIUM 10 MG/1
TAKE 1 TABLET BY MOUTH ONCE DAILY IN THE EVENING FOR 30 DAYS TABLET, FILM COATED ORAL
Qty: 30 EACH | Refills: 0 | Status: ACTIVE | COMMUNITY

## 2025-05-08 RX ORDER — LOSARTAN POTASSIUM AND HYDROCHLOROTHIAZIDE 100; 25 MG/1; MG/1
TABLET, FILM COATED ORAL
Qty: 90 TABLET | Status: ACTIVE | COMMUNITY

## 2025-05-08 RX ORDER — CEFADROXIL 500 MG/1
TAKE 1 CAPSULE BY MOUTH TWICE DAILY FOR 10 DAYS CAPSULE ORAL
Qty: 20 EACH | Refills: 0 | Status: ON HOLD | COMMUNITY

## 2025-05-08 RX ORDER — DOXYCYCLINE HYCLATE 100 MG/1
TAKE 1 TABLET BY MOUTH TWICE DAILY FOR 7 DAYS TABLET, FILM COATED ORAL
Qty: 14 EACH | Refills: 0 | Status: ON HOLD | COMMUNITY

## 2025-05-08 RX ORDER — EZETIMIBE 10 MG/1
TABLET ORAL
Qty: 85 TABLET | Status: ACTIVE | COMMUNITY

## 2025-05-08 RX ORDER — SUCRALFATE 1 G/1
TAKE 1 TABLET BY MOUTH 4 TIMES DAILY TABLET ORAL
Qty: 360 EACH | Refills: 0 | Status: ON HOLD | COMMUNITY

## 2025-05-08 RX ORDER — AMLODIPINE BESYLATE 10 MG/1
TAKE 1 TABLET BY MOUTH ONCE DAILY TABLET ORAL
Qty: 85 EACH | Refills: 1 | Status: ACTIVE | COMMUNITY

## 2025-05-08 RX ORDER — ESCITALOPRAM 5 MG/1
TABLET, FILM COATED ORAL
Qty: 30 TABLET | Status: ON HOLD | COMMUNITY

## 2025-05-08 RX ORDER — IPRATROPIUM BROMIDE 21 UG/1
USE 2 SPRAY(S) IN EACH NOSTRIL 4 TIMES DAILY AS NEEDED FOR 30 DAYS FOR RUNNY NOSE SPRAY, METERED NASAL
Qty: 30 MILLILITER | Refills: 0 | Status: ACTIVE | COMMUNITY

## 2025-05-08 RX ORDER — LEVOCETIRIZINE DIHYDROCHLORIDE 5 MG/1
TAKE 1 TABLET BY MOUTH ONCE DAILY FOR 30 DAYS AS NEEDED IN THE EVENING TABLET ORAL
Qty: 30 EACH | Refills: 0 | Status: ACTIVE | COMMUNITY

## 2025-05-08 RX ORDER — ALENDRONATE SODIUM 35 MG/1
TAKE 1 TABLET BY MOUTH ONCE A WEEK WITH 6-8 OZ ON PLAIN WATER, AT LEAST 30 MIN BEFORE FIRST FOOD, BEVERAGE, OR MEDICATION OF THE DAY TABLET ORAL
Qty: 12 EACH | Refills: 0 | Status: ACTIVE | COMMUNITY

## 2025-05-08 RX ORDER — EPINEPHRINE 0.3 MG/.3ML
INJECTION INTRAMUSCULAR; SUBCUTANEOUS
Qty: 2 EACH | Status: ACTIVE | COMMUNITY

## 2025-05-08 RX ORDER — RABEPRAZOLE SODIUM 20 MG/1
TAKE 1 TABLET BY MOUTH ONCE DAILY TABLET, DELAYED RELEASE ORAL
Qty: 30 EACH | Refills: 1 | Status: ON HOLD | COMMUNITY

## 2025-05-08 RX ORDER — FLUTICASONE PROPIONATE 50 UG/1
USE 1 SPRAY(S) IN EACH NOSTRIL TWICE DAILY FOR 30 DAYS AS NEEDED USE WITH AZELASTINE FOR ADDEDE EFFECT SPRAY, METERED NASAL
Qty: 16 GRAM | Refills: 0 | Status: ACTIVE | COMMUNITY

## 2025-05-08 NOTE — HPI-TODAY'S VISIT:
May 2025: no typical gerd symptoms. but after eating certain foods she gets " chest discomfort" which radiates to back and her sides. relieved by taking pepcid and mylanta. she takes pepcid and mylanta daily. Omeprazole has not worked previously. she takes miralax prn for constipation with improvement.

## 2025-05-08 NOTE — HPI-OTHER HISTORIES
Nov 2024: h pylori BT negative in aug 2024. vague mid chest discomfort X not always related to meals. started yesterday. also dyspeptic symptoms. takes miralax for constipation.  07/24 OV  Midchest discomfort has resolved, patient reports pepcid use QAM usually on an empty stomach, cardiac w/u wnl. No heartbunr as of late, notes dyspeptic symptoms usually postprandial, last episode 3 weeks ago, usually goes away on its on and lasts for less than a minute. Gastric polyps benign,   EGD 2024  Z-line regular, 35 cm from the incisors. - No gross lesions in the entire esophagus. - Two gastric polyps. Resected and retrieved. - Granular, nodular and texture changed mucosa in the gastric fundus and gastric body. - Gastroesophageal flap valve classified as Hill Grade II (fold present, opens with respiration). - Multiple biopsies were obtained in the lower third of the esophagus. - Normal esophagus  - Several biopsies were obtained in the gastric fundus, in the gastric body and in the gastric antrum - Chronic gastritis/reactive gastropathy  CHRONIC INACTIVE GASTRITIS WITH HISTOLOGICAL CHANGES SUGGESTIVE OF TREATED H. PYLORI GASTRITIS  March 24 visit:Patient underwent a EGD and a colonoscopy in February 2023. EGD revealed a 6 mm cardia hyperplastic polyp and additional small gastric antral polyps. chronic inactive gastritis, suggestive of treated H pylori gastritis. 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 pylori breath test positive in March 2023 status posttreatment with eradication confirmed on repeat H. pylori breath test in May 2023.  no heartburn. she cant explain her symptoms too well. feels pain in her midchest but seems to come up from her epigastric area. sometimes worse with hunger. this hasnt happened in recent past. using pepcid daily. not on PPI. mild nausea.  December 2022 visit: Patient was seen in December 22 for acid reflux and chest discomfort - was evaluate at [facility] ER. Chest x-ray was unrevealing. Labs revealed normal LFTs, hemoglobin 12.  egd / colonoscopy in july 2018 done at [facility]. benign polyps were removed. possible gastritis , may be antibiotics prescribed.  has trouble burping. no dysphagia. no heartburn. also feels globus sensation. was prescribed protonix but she isnt taking. using carafate 3 times per day. using rabeprazole instead as prescribed by pcp.

## 2025-05-22 ENCOUNTER — OFFICE VISIT (OUTPATIENT)
Dept: URBAN - METROPOLITAN AREA CLINIC 84 | Facility: CLINIC | Age: 75
End: 2025-05-22

## 2025-05-27 ENCOUNTER — OFFICE VISIT (OUTPATIENT)
Dept: URBAN - METROPOLITAN AREA CLINIC 84 | Facility: CLINIC | Age: 75
End: 2025-05-27

## 2025-05-29 ENCOUNTER — OFFICE VISIT (OUTPATIENT)
Dept: URBAN - METROPOLITAN AREA CLINIC 84 | Facility: CLINIC | Age: 75
End: 2025-05-29